# Patient Record
Sex: MALE | Race: WHITE | NOT HISPANIC OR LATINO | Employment: FULL TIME | URBAN - METROPOLITAN AREA
[De-identification: names, ages, dates, MRNs, and addresses within clinical notes are randomized per-mention and may not be internally consistent; named-entity substitution may affect disease eponyms.]

---

## 2021-04-06 DIAGNOSIS — Z23 ENCOUNTER FOR IMMUNIZATION: ICD-10-CM

## 2021-05-06 ENCOUNTER — OFFICE VISIT (OUTPATIENT)
Dept: FAMILY MEDICINE CLINIC | Facility: CLINIC | Age: 41
End: 2021-05-06
Payer: COMMERCIAL

## 2021-05-06 VITALS
DIASTOLIC BLOOD PRESSURE: 108 MMHG | BODY MASS INDEX: 35.44 KG/M2 | WEIGHT: 225.8 LBS | HEIGHT: 67 IN | RESPIRATION RATE: 19 BRPM | OXYGEN SATURATION: 97 % | TEMPERATURE: 97.8 F | SYSTOLIC BLOOD PRESSURE: 190 MMHG | HEART RATE: 84 BPM

## 2021-05-06 DIAGNOSIS — Z13.220 ENCOUNTER FOR SCREENING FOR LIPID DISORDER: Primary | ICD-10-CM

## 2021-05-06 DIAGNOSIS — I10 ESSENTIAL HYPERTENSION: ICD-10-CM

## 2021-05-06 PROCEDURE — 99203 OFFICE O/P NEW LOW 30 MIN: CPT | Performed by: FAMILY MEDICINE

## 2021-05-06 PROCEDURE — 3725F SCREEN DEPRESSION PERFORMED: CPT | Performed by: FAMILY MEDICINE

## 2021-05-06 RX ORDER — AMLODIPINE BESYLATE AND BENAZEPRIL HYDROCHLORIDE 5; 10 MG/1; MG/1
1 CAPSULE ORAL DAILY
Qty: 30 CAPSULE | Refills: 2 | Status: SHIPPED | OUTPATIENT
Start: 2021-05-06 | End: 2021-05-25 | Stop reason: SDUPTHER

## 2021-05-06 RX ORDER — HYDROCHLOROTHIAZIDE 25 MG/1
1 TABLET ORAL DAILY
COMMUNITY
Start: 2021-04-30 | End: 2021-05-25 | Stop reason: SDUPTHER

## 2021-05-06 NOTE — PROGRESS NOTES
BMI Counseling: Body mass index is 35 37 kg/m²  The BMI is above normal  Nutrition recommendations include decreasing portion sizes, decreasing fast food intake, limiting drinks that contain sugar, moderation in carbohydrate intake, increasing intake of lean protein, reducing intake of saturated and trans fat and reducing intake of cholesterol  Exercise recommendations include moderate physical activity 150 minutes/week and exercising 3-5 times per week  No pharmacotherapy was ordered  Assessment/Plan:     Chronic Problems:  No problem-specific Assessment & Plan notes found for this encounter  Visit Diagnosis:  Diagnoses and all orders for this visit:    Encounter for screening for lipid disorder  -     Comprehensive metabolic panel; Future  -     Lipid panel; Future    Essential hypertension  -     Comprehensive metabolic panel; Future  -     CBC; Future  -     TSH, 3rd generation; Future  -     Microalbumin / creatinine urine ratio; Future  -     UA w Reflex to Microscopic w Reflex to Culture -Lab Collect  -     amLODIPine-benazepril (LOTREL 5-10) 5-10 MG per capsule; Take 1 capsule by mouth daily    Other orders  -     hydrochlorothiazide (HYDRODIURIL) 25 mg tablet; Take 1 tablet by mouth daily    htn  Asymptomatic  ,discussed plan of care , med lotrel 5/10 to start continue hctz, rec weight diet modifications , DASH , home monitoring , goals of therapy   F/u       Subjective:    Patient ID: Sade Mcneill is a 39 y o  male      Establish   In attempt to get tooth extraction , could not be completed due to high blood pressure   No known issue or problem   Negative chest pain palpitations shortness of breath difficulty breathing cough lesions rash  Neg ha , vision chnages, neg dizzy lighthedness    Soc hx      Children   Teacher , international   lst assignment PI   In processs moving to [place]   - smoke   etoh social   - drug   Diet " not great       fam hx    father 68 a/w htn ,   Mother 67 a/w dimentia     Brother younger dm 1   Sister thyroid         The following portions of the patient's history were reviewed and updated as appropriate: allergies, current medications, past family history, past medical history, past social history, past surgical history and problem list     Review of Systems   Constitutional: Negative for appetite change, chills, fever and unexpected weight change  HENT: Negative for congestion, dental problem, ear pain, hearing loss, postnasal drip, rhinorrhea, sinus pressure, sinus pain, sneezing, sore throat, tinnitus and voice change  Eyes: Negative for visual disturbance  Respiratory: Negative for apnea, cough, chest tightness and shortness of breath  Cardiovascular: Negative for chest pain, palpitations and leg swelling  Gastrointestinal: Negative for abdominal pain, blood in stool, constipation, diarrhea, nausea and vomiting  Endocrine: Negative for cold intolerance, heat intolerance, polydipsia, polyphagia and polyuria  Genitourinary: Negative for decreased urine volume, difficulty urinating, dysuria, frequency and hematuria  Musculoskeletal: Negative for arthralgias, back pain, gait problem, joint swelling and myalgias  Skin: Negative for color change, rash and wound  Allergic/Immunologic: Negative for environmental allergies and food allergies  Neurological: Negative for dizziness, syncope, weakness, light-headedness, numbness and headaches  Hematological: Negative for adenopathy  Does not bruise/bleed easily  Psychiatric/Behavioral: Negative for sleep disturbance and suicidal ideas  The patient is not nervous/anxious            BP (!) 190/108   Pulse 84   Temp 97 8 °F (36 6 °C)   Resp 19   Ht 5' 7" (1 702 m)   Wt 102 kg (225 lb 12 8 oz)   SpO2 97%   BMI 35 37 kg/m²   Social History     Socioeconomic History    Marital status: /Civil Union     Spouse name: Not on file    Number of children: Not on file    Years of education: Not on file    Highest education level: Not on file   Occupational History    Not on file   Social Needs    Financial resource strain: Not on file    Food insecurity     Worry: Not on file     Inability: Not on file    Transportation needs     Medical: Not on file     Non-medical: Not on file   Tobacco Use    Smoking status: Never Smoker    Smokeless tobacco: Never Used   Substance and Sexual Activity    Alcohol use: Yes     Frequency: 4 or more times a week     Drinks per session: 10 or more     Binge frequency: Never    Drug use: Never    Sexual activity: Yes   Lifestyle    Physical activity     Days per week: Not on file     Minutes per session: Not on file    Stress: Not on file   Relationships    Social connections     Talks on phone: Not on file     Gets together: Not on file     Attends L[employer] service: Not on file     Active member of club or organization: Not on file     Attends meetings of clubs or organizations: Not on file     Relationship status: Not on file    Intimate partner violence     Fear of current or ex partner: Not on file     Emotionally abused: Not on file     Physically abused: Not on file     Forced sexual activity: Not on file   Other Topics Concern    Not on file   Social History Narrative    Not on file     Past Medical History:   Diagnosis Date    Hypertension      History reviewed  No pertinent family history  History reviewed  No pertinent surgical history  Current Outpatient Medications:     hydrochlorothiazide (HYDRODIURIL) 25 mg tablet, Take 1 tablet by mouth daily, Disp: , Rfl:     amLODIPine-benazepril (LOTREL 5-10) 5-10 MG per capsule, Take 1 capsule by mouth daily, Disp: 30 capsule, Rfl: 2    No Known Allergies       Lab Review   not applicable     Imaging: No results found  Objective:     Physical Exam  Constitutional:       General: He is not in acute distress  Appearance: He is well-developed  He is not ill-appearing or toxic-appearing     HENT: Head: Normocephalic and atraumatic  Neck:      Musculoskeletal: Normal range of motion and neck supple  Cardiovascular:      Rate and Rhythm: Normal rate and regular rhythm  Heart sounds: Normal heart sounds  Pulmonary:      Effort: Pulmonary effort is normal       Breath sounds: Normal breath sounds  Abdominal:      General: Bowel sounds are normal       Palpations: Abdomen is soft  Musculoskeletal: Normal range of motion  Skin:     General: Skin is warm and dry  Neurological:      Mental Status: He is alert and oriented to person, place, and time  Deep Tendon Reflexes: Reflexes are normal and symmetric  Psychiatric:         Behavior: Behavior normal          Thought Content: Thought content normal          Judgment: Judgment normal            There are no Patient Instructions on file for this visit  JENNIFER Anders    Portions of the record may have been created with voice recognition software  Occasional wrong word or "sound a like" substitutions may have occurred due to the inherent limitations of voice recognition software  Read the chart carefully and recognize, using context, where substitutions have occurred

## 2021-05-13 ENCOUNTER — APPOINTMENT (OUTPATIENT)
Dept: LAB | Facility: HOSPITAL | Age: 41
End: 2021-05-13
Payer: COMMERCIAL

## 2021-05-13 DIAGNOSIS — Z13.220 ENCOUNTER FOR SCREENING FOR LIPID DISORDER: ICD-10-CM

## 2021-05-13 DIAGNOSIS — I10 ESSENTIAL HYPERTENSION: ICD-10-CM

## 2021-05-13 LAB
ALBUMIN SERPL BCP-MCNC: 4.3 G/DL (ref 3.5–5)
ALP SERPL-CCNC: 80 U/L (ref 46–116)
ALT SERPL W P-5'-P-CCNC: 72 U/L (ref 12–78)
ANION GAP SERPL CALCULATED.3IONS-SCNC: 4 MMOL/L (ref 4–13)
AST SERPL W P-5'-P-CCNC: 37 U/L (ref 5–45)
BILIRUB SERPL-MCNC: 0.52 MG/DL (ref 0.2–1)
BILIRUB UR QL STRIP: NEGATIVE
BUN SERPL-MCNC: 15 MG/DL (ref 5–25)
CALCIUM SERPL-MCNC: 9.2 MG/DL (ref 8.3–10.1)
CHLORIDE SERPL-SCNC: 102 MMOL/L (ref 100–108)
CHOLEST SERPL-MCNC: 186 MG/DL (ref 50–200)
CLARITY UR: CLEAR
CO2 SERPL-SCNC: 33 MMOL/L (ref 21–32)
COLOR UR: NORMAL
CREAT SERPL-MCNC: 1.06 MG/DL (ref 0.6–1.3)
ERYTHROCYTE [DISTWIDTH] IN BLOOD BY AUTOMATED COUNT: 11.9 % (ref 11.6–15.1)
GFR SERPL CREATININE-BSD FRML MDRD: 87 ML/MIN/1.73SQ M
GLUCOSE SERPL-MCNC: 113 MG/DL (ref 65–140)
GLUCOSE UR STRIP-MCNC: NEGATIVE MG/DL
HCT VFR BLD AUTO: 45.3 % (ref 36.5–49.3)
HDLC SERPL-MCNC: 31 MG/DL
HGB BLD-MCNC: 15.8 G/DL (ref 12–17)
HGB UR QL STRIP.AUTO: NEGATIVE
KETONES UR STRIP-MCNC: NEGATIVE MG/DL
LDLC SERPL CALC-MCNC: 120 MG/DL (ref 0–100)
LEUKOCYTE ESTERASE UR QL STRIP: NEGATIVE
MCH RBC QN AUTO: 28.8 PG (ref 26.8–34.3)
MCHC RBC AUTO-ENTMCNC: 34.9 G/DL (ref 31.4–37.4)
MCV RBC AUTO: 83 FL (ref 82–98)
NITRITE UR QL STRIP: NEGATIVE
NONHDLC SERPL-MCNC: 155 MG/DL
PH UR STRIP.AUTO: 6 [PH]
PLATELET # BLD AUTO: 257 THOUSANDS/UL (ref 149–390)
PMV BLD AUTO: 9.5 FL (ref 8.9–12.7)
POTASSIUM SERPL-SCNC: 3.7 MMOL/L (ref 3.5–5.3)
PROT SERPL-MCNC: 7.9 G/DL (ref 6.4–8.2)
PROT UR STRIP-MCNC: NEGATIVE MG/DL
RBC # BLD AUTO: 5.49 MILLION/UL (ref 3.88–5.62)
SODIUM SERPL-SCNC: 139 MMOL/L (ref 136–145)
SP GR UR STRIP.AUTO: <=1.005 (ref 1–1.03)
TRIGL SERPL-MCNC: 176 MG/DL
TSH SERPL DL<=0.05 MIU/L-ACNC: 2.42 UIU/ML (ref 0.36–3.74)
UROBILINOGEN UR QL STRIP.AUTO: 0.2 E.U./DL
WBC # BLD AUTO: 4.95 THOUSAND/UL (ref 4.31–10.16)

## 2021-05-13 PROCEDURE — 84443 ASSAY THYROID STIM HORMONE: CPT

## 2021-05-13 PROCEDURE — 85027 COMPLETE CBC AUTOMATED: CPT

## 2021-05-13 PROCEDURE — 80061 LIPID PANEL: CPT

## 2021-05-13 PROCEDURE — 36415 COLL VENOUS BLD VENIPUNCTURE: CPT

## 2021-05-13 PROCEDURE — 82570 ASSAY OF URINE CREATININE: CPT

## 2021-05-13 PROCEDURE — 82043 UR ALBUMIN QUANTITATIVE: CPT

## 2021-05-13 PROCEDURE — 81003 URINALYSIS AUTO W/O SCOPE: CPT | Performed by: FAMILY MEDICINE

## 2021-05-13 PROCEDURE — 80053 COMPREHEN METABOLIC PANEL: CPT

## 2021-05-14 LAB
CREAT UR-MCNC: 30.1 MG/DL
MICROALBUMIN UR-MCNC: <5 MG/L (ref 0–20)
MICROALBUMIN/CREAT 24H UR: <17 MG/G CREATININE (ref 0–30)

## 2021-05-25 ENCOUNTER — OFFICE VISIT (OUTPATIENT)
Dept: FAMILY MEDICINE CLINIC | Facility: CLINIC | Age: 41
End: 2021-05-25
Payer: COMMERCIAL

## 2021-05-25 VITALS
OXYGEN SATURATION: 97 % | BODY MASS INDEX: 35.28 KG/M2 | DIASTOLIC BLOOD PRESSURE: 78 MMHG | WEIGHT: 224.8 LBS | SYSTOLIC BLOOD PRESSURE: 118 MMHG | TEMPERATURE: 97.6 F | HEIGHT: 67 IN | HEART RATE: 82 BPM

## 2021-05-25 DIAGNOSIS — Z13.220 ENCOUNTER FOR SCREENING FOR LIPID DISORDER: Primary | ICD-10-CM

## 2021-05-25 DIAGNOSIS — I10 ESSENTIAL HYPERTENSION: ICD-10-CM

## 2021-05-25 PROCEDURE — 1036F TOBACCO NON-USER: CPT | Performed by: FAMILY MEDICINE

## 2021-05-25 PROCEDURE — 99214 OFFICE O/P EST MOD 30 MIN: CPT | Performed by: FAMILY MEDICINE

## 2021-05-25 PROCEDURE — 3008F BODY MASS INDEX DOCD: CPT | Performed by: FAMILY MEDICINE

## 2021-05-25 PROCEDURE — 3078F DIAST BP <80 MM HG: CPT | Performed by: FAMILY MEDICINE

## 2021-05-25 PROCEDURE — 3074F SYST BP LT 130 MM HG: CPT | Performed by: FAMILY MEDICINE

## 2021-05-25 RX ORDER — HYDROCHLOROTHIAZIDE 25 MG/1
12.5 TABLET ORAL DAILY
Qty: 90 TABLET | Refills: 1 | Status: SHIPPED | OUTPATIENT
Start: 2021-05-25 | End: 2021-06-02 | Stop reason: SDUPTHER

## 2021-05-25 RX ORDER — AMLODIPINE BESYLATE AND BENAZEPRIL HYDROCHLORIDE 5; 10 MG/1; MG/1
1 CAPSULE ORAL DAILY
Qty: 90 CAPSULE | Refills: 1 | Status: SHIPPED | OUTPATIENT
Start: 2021-05-25 | End: 2021-06-02 | Stop reason: SDUPTHER

## 2021-05-25 NOTE — ASSESSMENT & PLAN NOTE
Discussed goals of therapy, reviewed dietary modifications, increasing fiber intake weight loss management, discussed medications statins recheck lipid profile 4 months

## 2021-05-25 NOTE — ASSESSMENT & PLAN NOTE
Stable, within goal, discussed medications dosing timing, will continue present medicines,   Discussed goals of therapy main so blood pressure numbers in the 120s over 70s, reviewed medications indications and side effect profiles, discussed dosing, recommended diet modification such as salt and sodium restriction with weight loss program   Recommended regular routine exercise and stretching program

## 2021-05-25 NOTE — PROGRESS NOTES
Assessment/Plan:     Chronic Problems:  Essential hypertension   Stable, within goal, discussed medications dosing timing, will continue present medicines,   Discussed goals of therapy main so blood pressure numbers in the 120s over 70s, reviewed medications indications and side effect profiles, discussed dosing, recommended diet modification such as salt and sodium restriction with weight loss program   Recommended regular routine exercise and stretching program    Encounter for screening for lipid disorder   Discussed goals of therapy, reviewed dietary modifications, increasing fiber intake weight loss management, discussed medications statins recheck lipid profile 4 months      Visit Diagnosis:  Diagnoses and all orders for this visit:    Encounter for screening for lipid disorder  -     Comprehensive metabolic panel; Future  -     Lipid panel; Future    Essential hypertension  -     amLODIPine-benazepril (LOTREL 5-10) 5-10 MG per capsule; Take 1 capsule by mouth daily  -     hydrochlorothiazide (HYDRODIURIL) 25 mg tablet; Take 0 5 tablets (12 5 mg total) by mouth daily          Subjective:    Patient ID: Nimco Villalpando is a 39 y o  male      Follow-up on lab work and blood pressure   Lotrel 5/10, hydrochlorothiazide 12 5 mg   taking medications daily negative missed dosing   has been monitoring home blood pressures mild elevations in the morning has been taking both medications simultaneously in the morning hours  Negative chest pain palpitations shortness of breath difficulty breathing cough lesions rash   family history significant for blood pressure, negative heart disease   understands necessity of weight loss program   will need to continue to make changes in dietary habits      The following portions of the patient's history were reviewed and updated as appropriate: allergies, current medications, past family history, past medical history, past social history, past surgical history and problem list     Review of Systems   Constitutional: Negative for appetite change, chills, fever and unexpected weight change  HENT: Negative for congestion, dental problem, ear pain, hearing loss, postnasal drip, rhinorrhea, sinus pressure, sinus pain, sneezing, sore throat, tinnitus and voice change  Eyes: Negative for visual disturbance  Respiratory: Negative for apnea, cough, chest tightness and shortness of breath  Cardiovascular: Negative for chest pain, palpitations and leg swelling  Gastrointestinal: Negative for abdominal pain, blood in stool, constipation, diarrhea, nausea and vomiting  Endocrine: Negative for cold intolerance, heat intolerance, polydipsia, polyphagia and polyuria  Genitourinary: Negative for decreased urine volume, difficulty urinating, dysuria, frequency and hematuria  Musculoskeletal: Negative for arthralgias, back pain, gait problem, joint swelling and myalgias  Skin: Negative for color change, rash and wound  Allergic/Immunologic: Negative for environmental allergies and food allergies  Neurological: Negative for dizziness, syncope, weakness, light-headedness, numbness and headaches  Hematological: Negative for adenopathy  Does not bruise/bleed easily  Psychiatric/Behavioral: Negative for sleep disturbance and suicidal ideas  The patient is not nervous/anxious            /78 (BP Location: Left arm, Patient Position: Sitting)   Pulse 82   Temp 97 6 °F (36 4 °C) (Tympanic)   Ht 5' 7" (1 702 m)   Wt 102 kg (224 lb 12 8 oz)   SpO2 97%   BMI 35 21 kg/m²   Social History     Socioeconomic History    Marital status: /Civil Union     Spouse name: Not on file    Number of children: Not on file    Years of education: Not on file    Highest education level: Not on file   Occupational History    Not on file   Social Needs    Financial resource strain: Not on file    Food insecurity     Worry: Not on file     Inability: Not on file   [employer] needs     Medical: Not on file     Non-medical: Not on file   Tobacco Use    Smoking status: Never Smoker    Smokeless tobacco: Never Used   Substance and Sexual Activity    Alcohol use: Yes     Frequency: 4 or more times a week     Drinks per session: 10 or more     Binge frequency: Never    Drug use: Never    Sexual activity: Yes   Lifestyle    Physical activity     Days per week: Not on file     Minutes per session: Not on file    Stress: Not on file   Relationships    Social connections     Talks on phone: Not on file     Gets together: Not on file     Attends [employer] service: Not on file     Active member of club or organization: Not on file     Attends meetings of clubs or organizations: Not on file     Relationship status: Not on file    Intimate partner violence     Fear of current or ex partner: Not on file     Emotionally abused: Not on file     Physically abused: Not on file     Forced sexual activity: Not on file   Other Topics Concern    Not on file   Social History Narrative    Not on file     Past Medical History:   Diagnosis Date    Hypertension      History reviewed  No pertinent family history  History reviewed  No pertinent surgical history      Current Outpatient Medications:     amLODIPine-benazepril (LOTREL 5-10) 5-10 MG per capsule, Take 1 capsule by mouth daily, Disp: 90 capsule, Rfl: 1    hydrochlorothiazide (HYDRODIURIL) 25 mg tablet, Take 0 5 tablets (12 5 mg total) by mouth daily, Disp: 90 tablet, Rfl: 1    No Known Allergies       Lab Review   Appointment on 05/13/2021   Component Date Value    Sodium 05/13/2021 139     Potassium 05/13/2021 3 7     Chloride 05/13/2021 102     CO2 05/13/2021 33*    ANION GAP 05/13/2021 4     BUN 05/13/2021 15     Creatinine 05/13/2021 1 06     Glucose 05/13/2021 113     Calcium 05/13/2021 9 2     AST 05/13/2021 37     ALT 05/13/2021 72     Alkaline Phosphatase 05/13/2021 80     Total Protein 05/13/2021 7 9     Albumin 05/13/2021 4 3     Total Bilirubin 05/13/2021 0 52     eGFR 05/13/2021 87     WBC 05/13/2021 4 95     RBC 05/13/2021 5 49     Hemoglobin 05/13/2021 15 8     Hematocrit 05/13/2021 45 3     MCV 05/13/2021 83     MCH 05/13/2021 28 8     MCHC 05/13/2021 34 9     RDW 05/13/2021 11 9     Platelets 75/50/2669 257     MPV 05/13/2021 9 5     Cholesterol 05/13/2021 186     Triglycerides 05/13/2021 176*    HDL, Direct 05/13/2021 31*    LDL Calculated 05/13/2021 120*    Non-HDL-Chol (CHOL-HDL) 05/13/2021 155     TSH 3RD GENERATON 05/13/2021 2 423     Creatinine, Ur 05/13/2021 30 1     Microalbum  ,U,Random 05/13/2021 <5 0     Microalb Creat Ratio 05/13/2021 <17    Office Visit on 05/06/2021   Component Date Value    Color, UA 05/13/2021 Light Yellow     Clarity, UA 05/13/2021 Clear     Specific Gravity, UA 05/13/2021 <=1 005     pH, UA 05/13/2021 6 0     Leukocytes, UA 05/13/2021 Negative     Nitrite, UA 05/13/2021 Negative     Protein, UA 05/13/2021 Negative     Glucose, UA 05/13/2021 Negative     Ketones, UA 05/13/2021 Negative     Urobilinogen, UA 05/13/2021 0 2     Bilirubin, UA 05/13/2021 Negative     Blood, UA 05/13/2021 Negative         Imaging: No results found  Objective:     Physical Exam  Constitutional:       General: He is not in acute distress  Appearance: He is well-developed  He is not ill-appearing  HENT:      Head: Normocephalic and atraumatic  Neck:      Musculoskeletal: Normal range of motion and neck supple  Cardiovascular:      Rate and Rhythm: Normal rate and regular rhythm  Heart sounds: Normal heart sounds  Pulmonary:      Effort: Pulmonary effort is normal       Breath sounds: Normal breath sounds  Musculoskeletal: Normal range of motion  Skin:     General: Skin is warm and dry  Neurological:      Mental Status: He is alert and oriented to person, place, and time  Deep Tendon Reflexes: Reflexes are normal and symmetric     Psychiatric: Behavior: Behavior normal          Thought Content: Thought content normal          Judgment: Judgment normal            There are no Patient Instructions on file for this visit  JENNIFER Omalley    Portions of the record may have been created with voice recognition software  Occasional wrong word or "sound a like" substitutions may have occurred due to the inherent limitations of voice recognition software  Read the chart carefully and recognize, using context, where substitutions have occurred

## 2021-06-01 ENCOUNTER — TELEPHONE (OUTPATIENT)
Dept: FAMILY MEDICINE CLINIC | Facility: CLINIC | Age: 41
End: 2021-06-01

## 2021-06-01 NOTE — TELEPHONE ENCOUNTER
Patient called and advised he came in due to high blood pressure  He said he can't schedule to get his tooth extraction with oral surgeon until he saw his primary  Is he okay to schedule that appt now that he saw you? Patient is also wondering if cold sores are side effects of the medication he is taking  Please advise

## 2021-06-02 DIAGNOSIS — I10 ESSENTIAL HYPERTENSION: ICD-10-CM

## 2021-06-02 RX ORDER — HYDROCHLOROTHIAZIDE 25 MG/1
12.5 TABLET ORAL DAILY
Qty: 45 TABLET | Refills: 1 | Status: SHIPPED | OUTPATIENT
Start: 2021-06-02 | End: 2021-09-22

## 2021-06-02 RX ORDER — AMLODIPINE BESYLATE AND BENAZEPRIL HYDROCHLORIDE 5; 10 MG/1; MG/1
1 CAPSULE ORAL DAILY
Qty: 90 CAPSULE | Refills: 1 | Status: SHIPPED | OUTPATIENT
Start: 2021-06-02 | End: 2021-11-22

## 2021-06-02 NOTE — TELEPHONE ENCOUNTER
The pharmacy claims they never received the medications on May 25th  It says it was confirmed, but they didn't get it

## 2021-09-22 DIAGNOSIS — I10 ESSENTIAL HYPERTENSION: ICD-10-CM

## 2021-09-22 RX ORDER — HYDROCHLOROTHIAZIDE 25 MG/1
TABLET ORAL
Qty: 45 TABLET | Refills: 1 | Status: SHIPPED | OUTPATIENT
Start: 2021-09-22 | End: 2022-05-25

## 2021-11-19 DIAGNOSIS — I10 ESSENTIAL HYPERTENSION: ICD-10-CM

## 2021-11-22 RX ORDER — AMLODIPINE BESYLATE AND BENAZEPRIL HYDROCHLORIDE 5; 10 MG/1; MG/1
CAPSULE ORAL
Qty: 90 CAPSULE | Refills: 1 | Status: SHIPPED | OUTPATIENT
Start: 2021-11-22 | End: 2022-05-25

## 2022-05-25 DIAGNOSIS — I10 ESSENTIAL HYPERTENSION: Primary | ICD-10-CM

## 2022-05-25 DIAGNOSIS — Z13.220 ENCOUNTER FOR SCREENING FOR LIPID DISORDER: ICD-10-CM

## 2022-06-19 DIAGNOSIS — I10 ESSENTIAL HYPERTENSION: ICD-10-CM

## 2022-06-20 RX ORDER — AMLODIPINE BESYLATE AND BENAZEPRIL HYDROCHLORIDE 5; 10 MG/1; MG/1
CAPSULE ORAL
Qty: 30 CAPSULE | Refills: 0 | Status: SHIPPED | OUTPATIENT
Start: 2022-06-20 | End: 2022-07-14

## 2022-07-14 DIAGNOSIS — I10 ESSENTIAL HYPERTENSION: ICD-10-CM

## 2022-07-14 RX ORDER — AMLODIPINE BESYLATE AND BENAZEPRIL HYDROCHLORIDE 5; 10 MG/1; MG/1
CAPSULE ORAL
Qty: 30 CAPSULE | Refills: 0 | Status: SHIPPED | OUTPATIENT
Start: 2022-07-14 | End: 2022-08-06

## 2022-07-28 ENCOUNTER — TELEPHONE (OUTPATIENT)
Dept: FAMILY MEDICINE CLINIC | Facility: CLINIC | Age: 42
End: 2022-07-28

## 2022-08-07 DIAGNOSIS — I10 ESSENTIAL HYPERTENSION: ICD-10-CM

## 2022-08-09 RX ORDER — HYDROCHLOROTHIAZIDE 25 MG/1
TABLET ORAL
Qty: 30 TABLET | Refills: 0 | Status: SHIPPED | OUTPATIENT
Start: 2022-08-09

## 2022-10-12 PROBLEM — Z13.220 ENCOUNTER FOR SCREENING FOR LIPID DISORDER: Status: RESOLVED | Noted: 2021-05-25 | Resolved: 2022-10-12
